# Patient Record
Sex: FEMALE
[De-identification: names, ages, dates, MRNs, and addresses within clinical notes are randomized per-mention and may not be internally consistent; named-entity substitution may affect disease eponyms.]

---

## 2018-11-16 ENCOUNTER — APPOINTMENT (OUTPATIENT)
Dept: PULMONOLOGY | Facility: CLINIC | Age: 38
End: 2018-11-16
Payer: COMMERCIAL

## 2018-11-16 VITALS
OXYGEN SATURATION: 97 % | WEIGHT: 140 LBS | RESPIRATION RATE: 16 BRPM | HEART RATE: 87 BPM | BODY MASS INDEX: 24.8 KG/M2 | DIASTOLIC BLOOD PRESSURE: 56 MMHG | HEIGHT: 63 IN | SYSTOLIC BLOOD PRESSURE: 92 MMHG

## 2018-11-16 PROBLEM — Z00.00 ENCOUNTER FOR PREVENTIVE HEALTH EXAMINATION: Status: ACTIVE | Noted: 2018-11-16

## 2018-11-16 PROCEDURE — 94010 BREATHING CAPACITY TEST: CPT

## 2018-11-16 PROCEDURE — 95012 NITRIC OXIDE EXP GAS DETER: CPT

## 2018-11-16 PROCEDURE — 99204 OFFICE O/P NEW MOD 45 MIN: CPT | Mod: 25

## 2018-11-16 RX ORDER — AZITHROMYCIN 250 MG/1
250 TABLET, FILM COATED ORAL DAILY
Qty: 5 | Refills: 0 | Status: ACTIVE | COMMUNITY
Start: 2018-11-16 | End: 1900-01-01

## 2018-11-16 RX ORDER — IPRATROPIUM BROMIDE 21 UG/1
0.03 SPRAY NASAL 3 TIMES DAILY
Qty: 1 | Refills: 2 | Status: ACTIVE | COMMUNITY
Start: 2018-11-16 | End: 1900-01-01

## 2018-11-16 RX ORDER — MONTELUKAST 10 MG/1
10 TABLET, FILM COATED ORAL
Qty: 30 | Refills: 2 | Status: ACTIVE | COMMUNITY
Start: 2018-11-16 | End: 1900-01-01

## 2018-11-23 ENCOUNTER — APPOINTMENT (OUTPATIENT)
Dept: PULMONOLOGY | Facility: CLINIC | Age: 38
End: 2018-11-23
Payer: COMMERCIAL

## 2018-11-23 VITALS
OXYGEN SATURATION: 98 % | TEMPERATURE: 98.2 F | DIASTOLIC BLOOD PRESSURE: 71 MMHG | RESPIRATION RATE: 18 BRPM | SYSTOLIC BLOOD PRESSURE: 106 MMHG | HEART RATE: 76 BPM

## 2018-11-23 PROCEDURE — 99213 OFFICE O/P EST LOW 20 MIN: CPT | Mod: 25

## 2018-11-23 PROCEDURE — 94010 BREATHING CAPACITY TEST: CPT

## 2018-11-23 PROCEDURE — 95012 NITRIC OXIDE EXP GAS DETER: CPT

## 2018-11-29 ENCOUNTER — EMERGENCY (EMERGENCY)
Facility: HOSPITAL | Age: 38
LOS: 1 days | Discharge: HOME | End: 2018-11-29
Attending: EMERGENCY MEDICINE
Payer: COMMERCIAL

## 2018-11-29 VITALS
WEIGHT: 139.99 LBS | DIASTOLIC BLOOD PRESSURE: 68 MMHG | SYSTOLIC BLOOD PRESSURE: 114 MMHG | RESPIRATION RATE: 20 BRPM | TEMPERATURE: 98 F | HEIGHT: 64 IN | HEART RATE: 86 BPM | OXYGEN SATURATION: 96 %

## 2018-11-29 PROCEDURE — 99284 EMERGENCY DEPT VISIT MOD MDM: CPT

## 2018-11-29 PROCEDURE — 76815 OB US LIMITED FETUS(S): CPT | Mod: 26

## 2018-11-29 RX ORDER — DIPHENHYDRAMINE HCL 50 MG
12.5 CAPSULE ORAL ONCE
Qty: 0 | Refills: 0 | Status: COMPLETED | OUTPATIENT
Start: 2018-11-29 | End: 2018-11-29

## 2018-11-29 RX ORDER — ACETAMINOPHEN 500 MG
1000 TABLET ORAL ONCE
Qty: 0 | Refills: 0 | Status: COMPLETED | OUTPATIENT
Start: 2018-11-29 | End: 2018-11-29

## 2018-11-29 RX ORDER — SODIUM CHLORIDE 9 MG/ML
1000 INJECTION INTRAMUSCULAR; INTRAVENOUS; SUBCUTANEOUS ONCE
Qty: 0 | Refills: 0 | Status: COMPLETED | OUTPATIENT
Start: 2018-11-29 | End: 2018-11-29

## 2018-11-29 RX ORDER — PYRIDOXINE HCL (VITAMIN B6) 100 MG
100 TABLET ORAL ONCE
Qty: 0 | Refills: 0 | Status: COMPLETED | OUTPATIENT
Start: 2018-11-29 | End: 2018-11-29

## 2018-11-29 RX ORDER — METOCLOPRAMIDE HCL 10 MG
10 TABLET ORAL ONCE
Qty: 0 | Refills: 0 | Status: COMPLETED | OUTPATIENT
Start: 2018-11-29 | End: 2018-11-29

## 2018-11-29 RX ADMIN — Medication 1000 MILLIGRAM(S): at 23:23

## 2018-11-29 RX ADMIN — SODIUM CHLORIDE 1000 MILLILITER(S): 9 INJECTION INTRAMUSCULAR; INTRAVENOUS; SUBCUTANEOUS at 23:34

## 2018-11-29 RX ADMIN — Medication 12.5 MILLIGRAM(S): at 23:34

## 2018-11-29 RX ADMIN — Medication 10 MILLIGRAM(S): at 23:34

## 2018-11-29 NOTE — ED ADULT NURSE NOTE - CHPI ED NUR SYMPTOMS NEG
no numbness/no weakness/no loss of consciousness/no change in level of consciousness/no fever/no dizziness/no blurred vision/no confusion

## 2018-11-29 NOTE — ED ADULT NURSE NOTE - NSIMPLEMENTINTERV_GEN_ALL_ED
Implemented All Universal Safety Interventions:  [place] to call system. Call bell, personal items and telephone within reach. Instruct patient to call for assistance. Room bathroom lighting operational. Non-slip footwear when patient is off stretcher. Physically safe environment: no spills, clutter or unnecessary equipment. Stretcher in lowest position, wheels locked, appropriate side rails in place.

## 2018-11-29 NOTE — ED ADULT NURSE NOTE - OBJECTIVE STATEMENT
38 year old female presents to the ED via walk in with c/o N/V and HA. Patient is 6 weeks pregnant, stating that her headache began approx 12pm today, worsening throughout the day and now accompanied by nausea and vomiting. Patient has no c/o abdominal pain or decreased PO intake. Patient neuro status is in tact, ambulating safely without assistance, PERRL, moving all 4 extremities without difficulty. Denies CP, SOB, diarrhea, fever, chills, dizziness at this time. Comfort and safety maintained.

## 2018-11-30 VITALS
DIASTOLIC BLOOD PRESSURE: 61 MMHG | SYSTOLIC BLOOD PRESSURE: 98 MMHG | HEART RATE: 68 BPM | TEMPERATURE: 98 F | RESPIRATION RATE: 17 BRPM | OXYGEN SATURATION: 98 %

## 2018-11-30 LAB
ALBUMIN SERPL ELPH-MCNC: 4.6 G/DL — SIGNIFICANT CHANGE UP (ref 3.3–5)
ALP SERPL-CCNC: 42 U/L — SIGNIFICANT CHANGE UP (ref 40–120)
ALT FLD-CCNC: 16 U/L — SIGNIFICANT CHANGE UP (ref 10–45)
ANION GAP SERPL CALC-SCNC: 13 MMOL/L — SIGNIFICANT CHANGE UP (ref 5–17)
AST SERPL-CCNC: 13 U/L — SIGNIFICANT CHANGE UP (ref 10–40)
BASOPHILS # BLD AUTO: 0.1 K/UL — SIGNIFICANT CHANGE UP (ref 0–0.2)
BASOPHILS NFR BLD AUTO: 1 % — SIGNIFICANT CHANGE UP (ref 0–2)
BILIRUB SERPL-MCNC: 0.1 MG/DL — LOW (ref 0.2–1.2)
BUN SERPL-MCNC: 5 MG/DL — LOW (ref 7–23)
CALCIUM SERPL-MCNC: 9.6 MG/DL — SIGNIFICANT CHANGE UP (ref 8.4–10.5)
CHLORIDE SERPL-SCNC: 104 MMOL/L — SIGNIFICANT CHANGE UP (ref 96–108)
CO2 SERPL-SCNC: 22 MMOL/L — SIGNIFICANT CHANGE UP (ref 22–31)
CREAT SERPL-MCNC: 0.56 MG/DL — SIGNIFICANT CHANGE UP (ref 0.5–1.3)
EOSINOPHIL # BLD AUTO: 0.1 K/UL — SIGNIFICANT CHANGE UP (ref 0–0.5)
EOSINOPHIL NFR BLD AUTO: 1.2 % — SIGNIFICANT CHANGE UP (ref 0–6)
GLUCOSE SERPL-MCNC: 101 MG/DL — HIGH (ref 70–99)
HCG SERPL-ACNC: HIGH MIU/ML
HCT VFR BLD CALC: 34.8 % — SIGNIFICANT CHANGE UP (ref 34.5–45)
HGB BLD-MCNC: 12.5 G/DL — SIGNIFICANT CHANGE UP (ref 11.5–15.5)
LYMPHOCYTES # BLD AUTO: 2.3 K/UL — SIGNIFICANT CHANGE UP (ref 1–3.3)
LYMPHOCYTES # BLD AUTO: 29.8 % — SIGNIFICANT CHANGE UP (ref 13–44)
MCHC RBC-ENTMCNC: 34 PG — SIGNIFICANT CHANGE UP (ref 27–34)
MCHC RBC-ENTMCNC: 35.9 GM/DL — SIGNIFICANT CHANGE UP (ref 32–36)
MCV RBC AUTO: 94.8 FL — SIGNIFICANT CHANGE UP (ref 80–100)
MONOCYTES # BLD AUTO: 0.6 K/UL — SIGNIFICANT CHANGE UP (ref 0–0.9)
MONOCYTES NFR BLD AUTO: 7.8 % — SIGNIFICANT CHANGE UP (ref 2–14)
NEUTROPHILS # BLD AUTO: 4.7 K/UL — SIGNIFICANT CHANGE UP (ref 1.8–7.4)
NEUTROPHILS NFR BLD AUTO: 60.2 % — SIGNIFICANT CHANGE UP (ref 43–77)
PLATELET # BLD AUTO: 329 K/UL — SIGNIFICANT CHANGE UP (ref 150–400)
POTASSIUM SERPL-MCNC: 3.8 MMOL/L — SIGNIFICANT CHANGE UP (ref 3.5–5.3)
POTASSIUM SERPL-SCNC: 3.8 MMOL/L — SIGNIFICANT CHANGE UP (ref 3.5–5.3)
PROT SERPL-MCNC: 7.2 G/DL — SIGNIFICANT CHANGE UP (ref 6–8.3)
RBC # BLD: 3.67 M/UL — LOW (ref 3.8–5.2)
RBC # FLD: 11.3 % — SIGNIFICANT CHANGE UP (ref 10.3–14.5)
SODIUM SERPL-SCNC: 139 MMOL/L — SIGNIFICANT CHANGE UP (ref 135–145)
WBC # BLD: 7.8 K/UL — SIGNIFICANT CHANGE UP (ref 3.8–10.5)
WBC # FLD AUTO: 7.8 K/UL — SIGNIFICANT CHANGE UP (ref 3.8–10.5)

## 2018-11-30 PROCEDURE — 99284 EMERGENCY DEPT VISIT MOD MDM: CPT | Mod: 25

## 2018-11-30 PROCEDURE — 85027 COMPLETE CBC AUTOMATED: CPT

## 2018-11-30 PROCEDURE — 96375 TX/PRO/DX INJ NEW DRUG ADDON: CPT

## 2018-11-30 PROCEDURE — 96374 THER/PROPH/DIAG INJ IV PUSH: CPT

## 2018-11-30 PROCEDURE — 84702 CHORIONIC GONADOTROPIN TEST: CPT

## 2018-11-30 PROCEDURE — 76815 OB US LIMITED FETUS(S): CPT

## 2018-11-30 PROCEDURE — 96361 HYDRATE IV INFUSION ADD-ON: CPT

## 2018-11-30 PROCEDURE — 80053 COMPREHEN METABOLIC PANEL: CPT

## 2018-11-30 RX ADMIN — Medication 100 MILLIGRAM(S): at 00:57

## 2018-11-30 RX ADMIN — Medication 1000 MILLIGRAM(S): at 00:30

## 2018-11-30 RX ADMIN — SODIUM CHLORIDE 1000 MILLILITER(S): 9 INJECTION INTRAMUSCULAR; INTRAVENOUS; SUBCUTANEOUS at 00:30

## 2018-11-30 NOTE — ED PROVIDER NOTE - PHYSICAL EXAMINATION
Gen: NAD, AOx3, able to make needs known, non-toxic //            Head: NCAT //            HEENT: EOMI, oral mucosa moist, normal conjunctiva //            Lung: CTAB, no respiratory distress. //            CV: RRR, no murmurs //            Abd: soft, NTND, no guarding//            MSK: no visible deformities //            Neuro: CN 2-12 intact, No focal sensory or motor deficits //            Skin: Warm, well perfused, no rash //            Psych: normal affect.

## 2018-11-30 NOTE — ED PROVIDER NOTE - NS ED ROS FT
GENERAL: No fever or chills, //             EYES: no change in vision, //             HEENT: no trouble swallowing or speaking, //             CARDIAC: no chest pain, //              PULMONARY: no cough or SOB, //             GI: no abdominal pain, no nausea or no vomiting, no diarrhea or constipation, //             : No changes in urination, no vaginal bleeding //            SKIN: no rashes,  //            NEURO: per HPI,  //             MSK: No joint pain otherwise as HPI or negative.

## 2018-11-30 NOTE — ED PROVIDER NOTE - PROGRESS NOTE DETAILS
Simba: Informed pt she should be seen by her OB within the next few days for repeat beta testing. Her beta HCG is at appropriate level for her estimated week of gestation, but transabdominal US showed intrauterine hypoechoic structure without evidence of yolk sac or fetal pole indicating pt should have OB f/u to ensure viable IUP and appropriate rise in beta HCG. Pt verbalized understanding of this and states she will be able to get an appointment. OMAR Rangel MD : endorsed to me pending labs - hcg appropriately elevated, pt w/o any abd pain or vb. pt will f/u w her ob in next few days for repeat US and beta. Simba: Informed pt she should be seen by her OB within the next few days for repeat beta testing and US. Her beta HCG is at appropriate level for her estimated week of gestation, but transabdominal US showed intrauterine hypoechoic structure without evidence of yolk sac or fetal pole indicating pt should have OB f/u to ensure viable IUP and appropriate rise in beta HCG. Pt verbalized understanding of this and states she will be able to get an appointment.

## 2018-11-30 NOTE — ED PROVIDER NOTE - ATTENDING CONTRIBUTION TO CARE
Patient with headache/n/v and felling unwell without fever. Mild to moderate and has taken Tylenol without much relief. Translation services offered for Mandarin but patient prefers to translate with her  at bedside. No vaginal bleeding  with one prior miscarriage. No vaginal bleeding, no lower abdominal pain, no vaginal discharge, no cramping, no diarrhea, no change in bowel movements or flatus.  GEN: mild distress secondary to condition awake, eyes open spontaneously  HEENT: NCAT, dry MM, Trachea midline, normal conjunctiva, perrl  CHEST/LUNGS: Non-tachypneic, CTAB, bilateral breath sounds  CARDIAC: Non-tachycardic, normal perfusion  ABDOMEN: Soft, NTND, No rebound/guarding  MSK: No edema, no gross deformity of extremities  SKIN: No rashes, no petechiae, no vesicles  NEURO: CN grossly intact, normal coordination, no focal motor or sensory deficits  PSYCH: Alert, appropriate, cooperative, with capacity and insight  will get iv, cbc, cmp, headache analgesia, B6, fluids and reassess.  Will follow up on labs, analgesia, reassess and disposition as clinically indicated.  The patient was re-examined after interventions and is feeling much better.  The patient will follow up with their primary physician this week.  Patient  understands anticipatory guidance and was given strict return and follow up precautions. The patient has been informed of all concerning signs and symptoms to return to Emergency Department, the necessity to follow up with PMD/Clinic/follow up provided within 2-3 days was explained, and the patient reports understanding of above with capacity and insight if patient disposition is to be home.

## 2018-11-30 NOTE — ED PROVIDER NOTE - MEDICAL DECISION MAKING DETAILS
39 y/o F w/ headache and nausea. Will check transabdominal US for IUP, beta HCG, CBC, CMP fluids, reglan, benadryl, B6, Tylenol. Reassess.

## 2018-11-30 NOTE — ED PROVIDER NOTE - OBJECTIVE STATEMENT
37 y/o F w/ PMH of migraines and 6 weeks pregnant (), 37 y/o F w/ PMH of migraines and 6 weeks pregnant () presenting w/ headache and nausea. Pt reports that the HA started earlier today. She took 1 Tylenol with minimal relief. Her prior HAs are usually on the L sided but this HA is diffuse. It was gradual in onset. Mild to moderate in intensity. Additionally began to feel nauseous and had 1 episode of vomiting just prior to coming to the ED. Denies sensitivity to light or sounds. Pt reports seeing her OB 2 weeks ago and had US performed then.

## 2018-11-30 NOTE — ED PROVIDER NOTE - CARE PLAN
Principal Discharge DX:	Acute nonintractable headache, unspecified headache type  Secondary Diagnosis:	Non-intractable vomiting with nausea, unspecified vomiting type

## 2018-12-03 ENCOUNTER — APPOINTMENT (OUTPATIENT)
Dept: PULMONOLOGY | Facility: CLINIC | Age: 38
End: 2018-12-03

## 2018-12-06 ENCOUNTER — APPOINTMENT (OUTPATIENT)
Dept: PULMONOLOGY | Facility: CLINIC | Age: 38
End: 2018-12-06

## 2019-02-10 ENCOUNTER — TRANSCRIPTION ENCOUNTER (OUTPATIENT)
Age: 39
End: 2019-02-10

## 2022-11-26 ENCOUNTER — NON-APPOINTMENT (OUTPATIENT)
Age: 42
End: 2022-11-26

## 2023-03-30 ENCOUNTER — APPOINTMENT (OUTPATIENT)
Dept: PULMONOLOGY | Facility: CLINIC | Age: 43
End: 2023-03-30
Payer: COMMERCIAL

## 2023-03-30 VITALS
RESPIRATION RATE: 16 BRPM | DIASTOLIC BLOOD PRESSURE: 81 MMHG | HEART RATE: 72 BPM | SYSTOLIC BLOOD PRESSURE: 126 MMHG | OXYGEN SATURATION: 97 %

## 2023-03-30 DIAGNOSIS — R05.9 COUGH, UNSPECIFIED: ICD-10-CM

## 2023-03-30 DIAGNOSIS — R09.82 POSTNASAL DRIP: ICD-10-CM

## 2023-03-30 DIAGNOSIS — R91.1 SOLITARY PULMONARY NODULE: ICD-10-CM

## 2023-03-30 PROCEDURE — 71046 X-RAY EXAM CHEST 2 VIEWS: CPT

## 2023-03-30 PROCEDURE — 94727 GAS DIL/WSHOT DETER LNG VOL: CPT

## 2023-03-30 PROCEDURE — ZZZZZ: CPT

## 2023-03-30 PROCEDURE — 94729 DIFFUSING CAPACITY: CPT

## 2023-03-30 PROCEDURE — 99214 OFFICE O/P EST MOD 30 MIN: CPT | Mod: 25

## 2023-03-30 PROCEDURE — 94010 BREATHING CAPACITY TEST: CPT

## 2023-03-30 PROCEDURE — 95012 NITRIC OXIDE EXP GAS DETER: CPT

## 2023-03-30 PROCEDURE — 88738 HGB QUANT TRANSCUTANEOUS: CPT

## 2023-03-30 RX ORDER — AZITHROMYCIN 250 MG/1
250 TABLET, FILM COATED ORAL
Qty: 1 | Refills: 1 | Status: ACTIVE | COMMUNITY
Start: 2023-03-30 | End: 1900-01-01

## 2023-03-30 RX ORDER — FLUTICASONE PROPIONATE 50 UG/1
50 SPRAY, METERED NASAL
Qty: 3 | Refills: 3 | Status: ACTIVE | COMMUNITY
Start: 2023-03-30 | End: 1900-01-01

## 2023-03-30 RX ORDER — PREDNISONE 10 MG/1
10 TABLET ORAL
Qty: 12 | Refills: 0 | Status: ACTIVE | COMMUNITY
Start: 2023-03-30 | End: 1900-01-01

## 2023-04-01 NOTE — ASSESSMENT
[FreeTextEntry1] : Start Z-Beto and prednisone taper.\par Start Flonase nasal spray for postnasal drip.\par Also advised her on well hydration.\par Get noncontrast chest CT scan to follow-up on history of lung nodule.\par

## 2023-04-01 NOTE — HISTORY OF PRESENT ILLNESS
[TextBox_4] : Patient came in complaining of dry cough and throat clearing for 10 days, no chest pain, shortness of breath or fever.\par Ms. Moya has a history of lung nodule shown a previous chest CT scan.

## 2023-04-01 NOTE — PROCEDURE
[FreeTextEntry1] : Pulmonary Function Test performed in my office today: Spirometry: Within normal limits; Lung Volume: Within normal limits; Diffusion: Within normal limits.\par ___________\par \par  Exhaled Nitric Oxide             Final\par \par No Documents Attached\par \par \par   Test   Result   Flag Reference Goal Last Verified \par   Exhaled Nitric Oxide 9      REQUIRED \par \par  Ordered by: GLORIA URRUTIA       Collected/Examined: 30Mar2023 12:07PM       \par Verification Required       Stage: Final       \par  Performed at: In Office       Performed by: GLORIA URRUTIA       Resulted: 30Mar2023 12:07PM       Last Updated: 30Mar2023 12:09PM       \par ___________\par \par  POCT - Hemoglobin (Hgb), quantitative, transcutaneous             Final\par \par No Documents Attached\par \par \par   Test   Result   Flag Reference Goal Last Verified \par   POCT - Hemoglobin (Hgb), quantitative, transcutaneous 10.8      REQUIRED \par \par  Ordered by: GLORIA URRUTIA       Collected/Examined: 30Mar2023 12:02PM       \par Verification Required       Stage: Final       \par  Performed at: In Office       Performed by: MATHEW ANNE       Resulted: 30Mar2023 12:02PM       Last Updated: 30Mar2023 12:07PM       \par ________\par \par  Xray Chest 2 Views PA/Lat             Final\par \par No Documents Attached\par \par \par \par \par   \par Chest x-ray PA and lateral views performed in my office today showed clear lungs, no evidence of infiltrates or pleural effusions. \par \par \par  Ordered by: GLORIA URRUTIA       Collected/Examined: 30Mar2023 12:16PM       \par Verification Required       Stage: Final       \par  Performed at: In Office       Performed by: GLORIA URRUTIA       Resulted: 30Mar2023 12:16PM       Last Updated: 30Mar2023 12:16PM

## 2023-04-01 NOTE — DISCUSSION/SUMMARY
[FreeTextEntry1] : Ms. Moya is a patient with cough and throat clearing likely secondary to sinusitis/postnasal drip

## 2023-04-02 LAB — POCT - HEMOGLOBIN (HGB), QUANTITATIVE, TRANSCUTANEOUS: 10.8
